# Patient Record
Sex: FEMALE | Race: WHITE | Employment: FULL TIME | URBAN - METROPOLITAN AREA
[De-identification: names, ages, dates, MRNs, and addresses within clinical notes are randomized per-mention and may not be internally consistent; named-entity substitution may affect disease eponyms.]

---

## 2021-05-29 ENCOUNTER — RECORDS - HEALTHEAST (OUTPATIENT)
Dept: ADMINISTRATIVE | Facility: CLINIC | Age: 51
End: 2021-05-29

## 2021-07-13 ENCOUNTER — RECORDS - HEALTHEAST (OUTPATIENT)
Dept: ADMINISTRATIVE | Facility: CLINIC | Age: 51
End: 2021-07-13

## 2021-07-21 ENCOUNTER — RECORDS - HEALTHEAST (OUTPATIENT)
Dept: ADMINISTRATIVE | Facility: CLINIC | Age: 51
End: 2021-07-21

## 2022-04-23 ENCOUNTER — TRANSFERRED RECORDS (OUTPATIENT)
Dept: HEALTH INFORMATION MANAGEMENT | Facility: CLINIC | Age: 52
End: 2022-04-23

## 2022-08-02 ENCOUNTER — TELEPHONE (OUTPATIENT)
Dept: FAMILY MEDICINE | Facility: CLINIC | Age: 52
End: 2022-08-02

## 2022-08-02 NOTE — TELEPHONE ENCOUNTER
Left message to call back for: Rosalia  Information to relay to patient: need to discuss [facility] program

## 2022-08-16 ENCOUNTER — OFFICE VISIT (OUTPATIENT)
Dept: FAMILY MEDICINE | Facility: CLINIC | Age: 52
End: 2022-08-16
Payer: COMMERCIAL

## 2022-08-16 VITALS
SYSTOLIC BLOOD PRESSURE: 163 MMHG | RESPIRATION RATE: 16 BRPM | TEMPERATURE: 98.4 F | WEIGHT: 223 LBS | OXYGEN SATURATION: 95 % | DIASTOLIC BLOOD PRESSURE: 94 MMHG | HEART RATE: 77 BPM

## 2022-08-16 DIAGNOSIS — L03.032 PARONYCHIA OF TOE, LEFT: Primary | ICD-10-CM

## 2022-08-16 PROCEDURE — 99203 OFFICE O/P NEW LOW 30 MIN: CPT | Performed by: PHYSICIAN ASSISTANT

## 2022-08-16 RX ORDER — CETIRIZINE HYDROCHLORIDE 10 MG/1
10 TABLET, CHEWABLE ORAL DAILY
COMMUNITY

## 2022-08-16 RX ORDER — BACITRACIN ZINC 500 [USP'U]/G
OINTMENT TOPICAL 2 TIMES DAILY
Qty: 5 G | Refills: 1 | Status: SHIPPED | OUTPATIENT
Start: 2022-08-16 | End: 2022-08-23

## 2022-08-16 RX ORDER — LOSARTAN POTASSIUM 100 MG/1
100 TABLET ORAL
COMMUNITY
Start: 2022-01-05 | End: 2022-10-06

## 2022-08-16 RX ORDER — HYDROCHLOROTHIAZIDE 25 MG/1
TABLET ORAL
COMMUNITY
Start: 2022-07-27 | End: 2022-10-06

## 2022-08-17 NOTE — PROGRESS NOTES
Assessment & Plan:      Problem List Items Addressed This Visit    None     Visit Diagnoses     Paronychia of toe, left    -  Primary    Relevant Medications    cetirizine (ZYRTEC) 10 MG CHEW    bacitracin 500 UNIT/GM external ointment        Medical Decision Making  Patient presents with acute onset mild paronychia of the left foot third toe.  Recommend topical antibiotics at this time.  Continue with warm soaks as needed.  May also use cold compresses and elevation of the foot to help with swelling.  She will continue to keep her appointment with the podiatrist.  Discussed signs of worsening symptoms and when to follow-up if needed.     Subjective:      Rosalia Bolden is a 52 year old female here for evaluation of toenail pain.  Onset of symptoms was 2 to 3 days ago.  Patient recently developed an ingrown toenail of the left foot third toe.  She recently clipped the toenail back, but has been noting some erythema, swelling, and tenderness.  No fevers.  She has an appointment with podiatry in 1 to 2 weeks for removal of the nail as she has had recurring paronychia of this toe.     The following portions of the patient's history were reviewed and updated as appropriate: allergies, current medications, and problem list.     Review of Systems  Pertinent items are noted in HPI.    Allergies  Allergies   Allergen Reactions     Amoxicillin [A-Cillin]      Iodine [Gnp Iodides Decolorized]      Mycelex [Clotrimazole]        No family history on file.    Social History     Tobacco Use     Smoking status: Never Smoker     Smokeless tobacco: Never Used   Substance Use Topics     Alcohol use: Not on file        Objective:      BP (!) 163/94   Pulse 77   Temp 98.4  F (36.9  C) (Oral)   Resp 16   Wt 101.2 kg (223 lb)   SpO2 95%   General appearance - alert, well appearing, and in no distress and non-toxic  Skin - Left foot third toe: Very mild erythema at the edges of the toenail bilaterally, no active purulent  drainage, no active ingrown toenail visualized; toes otherwise normal to touch     Lab & Imaging Results    No results found for any visits on 08/16/22.    I personally reviewed these results and discussed findings with the patient.    The use of Dragon/Tempolib dictation services was used to construct the content of this note; any grammatical errors are non-intentional. Please contact the author directly if you are in need of any clarification.

## 2022-08-22 ENCOUNTER — OFFICE VISIT (OUTPATIENT)
Dept: FAMILY MEDICINE | Facility: CLINIC | Age: 52
End: 2022-08-22
Payer: COMMERCIAL

## 2022-08-22 VITALS
SYSTOLIC BLOOD PRESSURE: 138 MMHG | TEMPERATURE: 98.4 F | WEIGHT: 223.1 LBS | DIASTOLIC BLOOD PRESSURE: 82 MMHG | HEART RATE: 72 BPM

## 2022-08-22 DIAGNOSIS — M79.662 PAIN OF LEFT LOWER LEG: Primary | ICD-10-CM

## 2022-08-22 DIAGNOSIS — E66.812 CLASS 2 OBESITY DUE TO EXCESS CALORIES WITHOUT SERIOUS COMORBIDITY WITH BODY MASS INDEX (BMI) OF 35.0 TO 35.9 IN ADULT: ICD-10-CM

## 2022-08-22 DIAGNOSIS — I10 PRIMARY HYPERTENSION: ICD-10-CM

## 2022-08-22 DIAGNOSIS — E66.09 CLASS 2 OBESITY DUE TO EXCESS CALORIES WITHOUT SERIOUS COMORBIDITY WITH BODY MASS INDEX (BMI) OF 35.0 TO 35.9 IN ADULT: ICD-10-CM

## 2022-08-22 DIAGNOSIS — Z23 ENCOUNTER FOR ADMINISTRATION OF VACCINE: ICD-10-CM

## 2022-08-22 DIAGNOSIS — M54.50 ACUTE LOW BACK PAIN, UNSPECIFIED BACK PAIN LATERALITY, UNSPECIFIED WHETHER SCIATICA PRESENT: ICD-10-CM

## 2022-08-22 PROCEDURE — 99214 OFFICE O/P EST MOD 30 MIN: CPT | Mod: 25 | Performed by: FAMILY MEDICINE

## 2022-08-22 PROCEDURE — 90714 TD VACC NO PRESV 7 YRS+ IM: CPT | Performed by: FAMILY MEDICINE

## 2022-08-22 PROCEDURE — 90471 IMMUNIZATION ADMIN: CPT | Performed by: FAMILY MEDICINE

## 2022-08-22 NOTE — PROGRESS NOTES
Assessment & Plan     Pain of left lower leg 2/2 fall 7/20/22  Improving  Reviewed the emergency department notes on July 28, 2022    Acute low back pain 2/2 fall   Improved  Reviewed the emergency department notes on July 28, 2022    Primary hypertension  Stable on losartan and hydrochlorothiazide  Follow-up with primary care provider at a regular basis    Encounter for administration of vaccine  - TD PRESERV FREE, IM (7+ YRS) (DECAVAC/TENIVAC)    Class 2 obesity due to excess calories without serious comorbidity with body mass index (BMI) of 35.0 to 35.9 in adult  Motivational interviewing was utilized today  Start with avoiding snacking and stay busy throughout the day.      Rachna De Oliveira MD  [facility]   Rosalia is a 52 year old presenting for the following health issues:  Hospital F/U (Fell down the stairs at home 7/20, still has pain and swelling in left leg and right ankle.  Lower back is still bothersome, rates pain 1/10 today.   Went to the the [facility] room 7/20, CT scans were taken.  Was prescribed Flexeril and Lidocaine patches, she did not fill and was taking OTC for pain relief.  )      HPI     I reviewed ED notes from 7/20/22 whereby she tripped and fell down a full flight of stairs.  Fall; closed head injury; contusion of back, abdominal wall, knee, leg  Xray knee, CT chest/abdomen, CT spine, CT head were unremarkable    Feeling better  Still a little sore on her left leg and back  Trying to walk more  Only taking advil      Objective    /82 (BP Location: Left arm, Patient Position: Sitting, Cuff Size: Adult Large)   Pulse 72   Temp 98.4  F (36.9  C) (Oral)   Wt 101.2 kg (223 lb 1.6 oz)   There is no height or weight on file to calculate BMI.  Physical Exam     Constitutional: Patient is oriented to person, place, and time. Patient appears well-developed and well-nourished. No distress.   Head: Normocephalic and atraumatic.    Right Ear: External ear normal.   Left Ear: External ear normal.   Eyes: Conjunctivae and EOM are normal. Right eye exhibits no discharge. Left eye exhibits no discharge. No scleral icterus.   Neurological: Patient is alert and oriented to person, place, and time.  Skin: No rash noted. Patient is not diaphoretic. No erythema. No pallor.  Left leg: No swelling, deformity, ecchymosis.  No tenderness to palpation.  She has full range of motion of her knee.

## 2022-08-29 ENCOUNTER — OFFICE VISIT (OUTPATIENT)
Dept: PODIATRY | Facility: CLINIC | Age: 52
End: 2022-08-29
Payer: COMMERCIAL

## 2022-08-29 VITALS
SYSTOLIC BLOOD PRESSURE: 138 MMHG | RESPIRATION RATE: 12 BRPM | HEART RATE: 98 BPM | TEMPERATURE: 98.1 F | DIASTOLIC BLOOD PRESSURE: 80 MMHG

## 2022-08-29 DIAGNOSIS — L60.0 INGROWING NAIL: Primary | ICD-10-CM

## 2022-08-29 PROCEDURE — 99203 OFFICE O/P NEW LOW 30 MIN: CPT | Mod: 25 | Performed by: PODIATRIST

## 2022-08-29 PROCEDURE — 11750 EXCISION NAIL&NAIL MATRIX: CPT | Mod: T2 | Performed by: PODIATRIST

## 2022-08-29 RX ORDER — LIDOCAINE 50 MG/G
PATCH TOPICAL
COMMUNITY
Start: 2022-07-21 | End: 2022-10-06

## 2022-08-29 RX ORDER — IBUPROFEN 600 MG/1
600 TABLET, FILM COATED ORAL
COMMUNITY
Start: 2021-07-07 | End: 2022-10-06

## 2022-08-29 RX ORDER — CHOLECALCIFEROL (VITAMIN D3) 50 MCG
1 TABLET ORAL DAILY
COMMUNITY

## 2022-08-29 RX ORDER — CLINDAMYCIN HCL 300 MG
CAPSULE ORAL
COMMUNITY
Start: 2022-08-24

## 2022-08-29 RX ORDER — MELOXICAM 15 MG/1
15 TABLET ORAL
COMMUNITY
Start: 2022-04-12 | End: 2022-10-06

## 2022-08-29 RX ORDER — FLUOCINOLONE ACETONIDE 0.11 MG/ML
OIL AURICULAR (OTIC)
COMMUNITY
Start: 2021-09-06

## 2022-08-29 RX ORDER — MAGNESIUM OXIDE 400 MG/1
400 TABLET ORAL DAILY
COMMUNITY

## 2022-08-29 RX ORDER — METFORMIN HCL 500 MG
TABLET, EXTENDED RELEASE 24 HR ORAL
COMMUNITY
Start: 2022-06-23 | End: 2022-10-06

## 2022-08-29 RX ORDER — CYCLOBENZAPRINE HCL 10 MG
TABLET ORAL
COMMUNITY
Start: 2022-07-21 | End: 2022-10-06

## 2022-08-29 RX ORDER — TRIAMCINOLONE ACETONIDE 55 UG/1
1-2 SPRAY, METERED NASAL
COMMUNITY

## 2022-08-29 RX ORDER — ESTRADIOL 0.1 MG/G
CREAM VAGINAL
COMMUNITY
Start: 2021-10-05 | End: 2022-10-06

## 2022-08-29 RX ORDER — NYSTATIN 100000 U/G
CREAM TOPICAL DAILY PRN
COMMUNITY
Start: 2022-02-16

## 2022-08-29 RX ORDER — TRIAMCINOLONE ACETONIDE 1 MG/G
CREAM TOPICAL PRN
COMMUNITY
Start: 2022-02-16

## 2022-08-29 RX ORDER — POLYETHYLENE GLYCOL 3350 17 G/17G
1 POWDER, FOR SOLUTION ORAL DAILY
COMMUNITY

## 2022-08-29 RX ORDER — GLYCERIN/MIN OIL/POLYCARBOPHIL
6.7 GEL WITH APPLICATOR (GRAM) VAGINAL
COMMUNITY
Start: 2021-10-05

## 2022-08-29 ASSESSMENT — PAIN SCALES - GENERAL: PAINLEVEL: NO PAIN (1)

## 2022-08-29 NOTE — LETTER
"    8/29/2022         RE: Rosalia Bolden  [address]  [place] [address]        Dear Colleague,    Thank you for referring your patient, Rosalia Bolden, to the [facility]. Please see a copy of my visit note below.    FOOT AND ANKLE SURGERY/PODIATRY Progress Note        ASSESSMENT:   Ingrown Nail       TREATMENT:  -There is an ingrown nail on the lateral border left 3rd digit. We discussed both temporary and permanent nail removal today. She understands that the nail may become symptomatic in 2% of cases. She consents to the permanent nail avulsion procedure today.  -5 cc of 1% lidocaine plain was injected into the 3rd digit left foot. The digit was cleansed with betadine swab. Following the application of a digital Tourni-cot the following procedures were performed.  Attention was directed to the lateral border of the 3rd digit left toenail where utilizing an [place] elevator and an English anvil nail splitter the nail was split from distal to proximal to the level of the epionychium.  Next the offending border was removed.  3,30 second applications of phenol were applied to the matrix.  The wound was then flushed with copious amounts of alcohol. The Tourni-cot was removed. A dressing was applied comprising of bacitracin 2x2 and 1\" coban wrap.  The tourniquet was removed and normal color returned.    -Post-op instructions were dispensed. All questions invited and answered.   -I would like to see the patient again in one week for follow-up.     Damion Tabor DPM  [facility]      HPI: I was asked to see Rosalia Bolden today for a painful ingrown nail on the 3rd digit left foot. She complains of on-going pain and has tried to remove the ingrown nail herself with some relief. She would like to discuss permanent nail procedure today.     No past medical history on file.    No past surgical history on file.    Allergies   Allergen Reactions     " Amoxicillin [A-Cillin]      Iodine [Gnp Iodides Decolorized]      Mycelex [Clotrimazole]          Current Outpatient Medications:      cetirizine (ZYRTEC) 10 MG CHEW, Take 10 mg by mouth daily, Disp: , Rfl:      estradiol (ESTRACE) 0.1 MG/GM vaginal cream, Apply pea sized amount to perineum once daily if needed for irritation, Disp: , Rfl:      FLUNISOLIDE, NASAL, NA, Spray 2 sprays in nostril daily, Disp: , Rfl:      fluocinolone acetonide 0.01 % OIL, APPLY 2 DROPS IN BOTH EARS EVERY OTHER NIGHT FOR 1 WEEK, THEN AS NEEDED FOR EAR ITCHING., Disp: , Rfl:      hydrochlorothiazide (HYDRODIURIL) 25 MG tablet, , Disp: , Rfl:      ibuprofen (ADVIL/MOTRIN) 600 MG tablet, Take 600 mg by mouth, Disp: , Rfl:      lidocaine (LIDODERM) 5 % patch, , Disp: , Rfl:      losartan (COZAAR) 100 MG tablet, Take 100 mg by mouth, Disp: , Rfl:      magnesium oxide (MAG-OX) 400 MG tablet, , Disp: , Rfl:      nystatin (MYCOSTATIN) 399286 UNIT/GM external cream, , Disp: , Rfl:      polyethylene glycol (MIRALAX) 17 GM/Dose powder, , Disp: , Rfl:      Skin Protectants, Misc. (LANTISEPTIC SKIN PROTECTANT) 50 % ointment, Apply 4 g topically, Disp: , Rfl:      triamcinolone (KENALOG) 0.1 % external cream, , Disp: , Rfl:      triamcinolone (NASACORT) 55 MCG/ACT nasal aerosol, Spray 1-2 sprays in nostril, Disp: , Rfl:      Vaginal Lubricant (REPLENS) GEL, Place 6.7 g vaginally, Disp: , Rfl:      vitamin D3 (CHOLECALCIFEROL) 50 mcg (2000 units) tablet, , Disp: , Rfl:      BORIC ACID, Boric Acid 500 MG/vaginal SUPPOSITORY one tab as needed in vagina (Patient not taking: Reported on 8/16/2022), Disp: , Rfl:      clindamycin (CLEOCIN) 300 MG capsule, TAKE 2 CAPSULES 1 HOUR BEFORE APPOINTMENT (Patient not taking: Reported on 8/29/2022), Disp: , Rfl:      cyclobenzaprine (FLEXERIL) 10 MG tablet, , Disp: , Rfl:      diphenhydrAMINE (BENADRYL) 25 MG tablet, Take 1 tablet by mouth every 4 hours as needed. (Patient not taking: Reported on 8/16/2022), Disp: ,  Rfl:      meloxicam (MOBIC) 15 MG tablet, Take 15 mg by mouth (Patient not taking: Reported on 8/29/2022), Disp: , Rfl:      metFORMIN (GLUCOPHAGE XR) 500 MG 24 hr tablet, , Disp: , Rfl:      omeprazole (PRILOSEC) 20 MG DR capsule, Take 20 mg by mouth (Patient not taking: Reported on 8/29/2022), Disp: , Rfl:     No family history on file.    Social History     Socioeconomic History     Marital status:      Spouse name: Not on file     Number of children: Not on file     Years of education: Not on file     Highest education level: Not on file   Occupational History     Not on file   Tobacco Use     Smoking status: Never Smoker     Smokeless tobacco: Never Used   Substance and Sexual Activity     Alcohol use: Not on file     Drug use: Not on file     Sexual activity: Not on file   Other Topics Concern     Not on file   Social History Narrative     Not on file     Social Determinants of Health     Financial Resource Strain: Not on file   Food Insecurity: Not on file   Transportation Needs: Not on file   Physical Activity: Not on file   Stress: Not on file   Social Connections: Not on file   Intimate Partner Violence: Not on file   Housing Stability: Not on file       10 point Review of Systems is negative except for ingrown nail.     /80   Pulse 98   Temp 98.1  F (36.7  C)   Resp 12     BMI= There is no height or weight on file to calculate BMI.      OBJECTIVE:  Appearance: alert, well appearing, and in no distress.    /80   Pulse 98   Temp 98.1  F (36.7  C)   Resp 12     Vascular: Dorsalis pedis and posterior tibial pulses are palpable. There is pedal hair growth.  CFT < 3 sec from anterior tibial surface to distal digits bilaterally. There is no appreciable edema noted.  Dermatologic: Incurvated nail border along the lateral border 3rd digit left foot, no erythema.   Neurologic: All epicritic and proprioceptive sensations are grossly intact bilaterally.   Musculoskeletal: Mild pain lateral  border 3rd digit left foot.        Again, thank you for allowing me to participate in the care of your patient.        Sincerely,        Damion Tabor DPM

## 2022-08-29 NOTE — PROGRESS NOTES
"FOOT AND ANKLE SURGERY/PODIATRY Progress Note        ASSESSMENT:   Ingrown Nail       TREATMENT:  -There is an ingrown nail on the lateral border left 3rd digit. We discussed both temporary and permanent nail removal today. She understands that the nail may become symptomatic in 2% of cases. She consents to the permanent nail avulsion procedure today.  -5 cc of 1% lidocaine plain was injected into the 3rd digit left foot. The digit was cleansed with betadine swab. Following the application of a digital Tourni-cot the following procedures were performed.  Attention was directed to the lateral border of the 3rd digit left toenail where utilizing an [place] elevator and an English anvil nail splitter the nail was split from distal to proximal to the level of the epionychium.  Next the offending border was removed.  3,30 second applications of phenol were applied to the matrix.  The wound was then flushed with copious amounts of alcohol. The Tourni-cot was removed. A dressing was applied comprising of bacitracin 2x2 and 1\" coban wrap.  The tourniquet was removed and normal color returned.    -Post-op instructions were dispensed. All questions invited and answered.   -I would like to see the patient again in one week for follow-up.     Damion Tabor DPM  [facility]      HPI: I was asked to see Rosalia Bolden today for a painful ingrown nail on the 3rd digit left foot. She complains of on-going pain and has tried to remove the ingrown nail herself with some relief. She would like to discuss permanent nail procedure today.     No past medical history on file.    No past surgical history on file.    Allergies   Allergen Reactions     Amoxicillin [A-Cillin]      Iodine [Gnp Iodides Decolorized]      Mycelex [Clotrimazole]          Current Outpatient Medications:      cetirizine (ZYRTEC) 10 MG CHEW, Take 10 mg by mouth daily, Disp: , Rfl:      estradiol (ESTRACE) 0.1 MG/GM vaginal cream, " Apply pea sized amount to perineum once daily if needed for irritation, Disp: , Rfl:      FLUNISOLIDE, NASAL, NA, Spray 2 sprays in nostril daily, Disp: , Rfl:      fluocinolone acetonide 0.01 % OIL, APPLY 2 DROPS IN BOTH EARS EVERY OTHER NIGHT FOR 1 WEEK, THEN AS NEEDED FOR EAR ITCHING., Disp: , Rfl:      hydrochlorothiazide (HYDRODIURIL) 25 MG tablet, , Disp: , Rfl:      ibuprofen (ADVIL/MOTRIN) 600 MG tablet, Take 600 mg by mouth, Disp: , Rfl:      lidocaine (LIDODERM) 5 % patch, , Disp: , Rfl:      losartan (COZAAR) 100 MG tablet, Take 100 mg by mouth, Disp: , Rfl:      magnesium oxide (MAG-OX) 400 MG tablet, , Disp: , Rfl:      nystatin (MYCOSTATIN) 307923 UNIT/GM external cream, , Disp: , Rfl:      polyethylene glycol (MIRALAX) 17 GM/Dose powder, , Disp: , Rfl:      Skin Protectants, Misc. (LANTISEPTIC SKIN PROTECTANT) 50 % ointment, Apply 4 g topically, Disp: , Rfl:      triamcinolone (KENALOG) 0.1 % external cream, , Disp: , Rfl:      triamcinolone (NASACORT) 55 MCG/ACT nasal aerosol, Spray 1-2 sprays in nostril, Disp: , Rfl:      Vaginal Lubricant (REPLENS) GEL, Place 6.7 g vaginally, Disp: , Rfl:      vitamin D3 (CHOLECALCIFEROL) 50 mcg (2000 units) tablet, , Disp: , Rfl:      BORIC ACID, Boric Acid 500 MG/vaginal SUPPOSITORY one tab as needed in vagina (Patient not taking: Reported on 8/16/2022), Disp: , Rfl:      clindamycin (CLEOCIN) 300 MG capsule, TAKE 2 CAPSULES 1 HOUR BEFORE APPOINTMENT (Patient not taking: Reported on 8/29/2022), Disp: , Rfl:      cyclobenzaprine (FLEXERIL) 10 MG tablet, , Disp: , Rfl:      diphenhydrAMINE (BENADRYL) 25 MG tablet, Take 1 tablet by mouth every 4 hours as needed. (Patient not taking: Reported on 8/16/2022), Disp: , Rfl:      meloxicam (MOBIC) 15 MG tablet, Take 15 mg by mouth (Patient not taking: Reported on 8/29/2022), Disp: , Rfl:      metFORMIN (GLUCOPHAGE XR) 500 MG 24 hr tablet, , Disp: , Rfl:      omeprazole (PRILOSEC) 20 MG DR capsule, Take 20 mg by mouth  (Patient not taking: Reported on 8/29/2022), Disp: , Rfl:     No family history on file.    Social History     Socioeconomic History     Marital status:      Spouse name: Not on file     Number of children: Not on file     Years of education: Not on file     Highest education level: Not on file   Occupational History     Not on file   Tobacco Use     Smoking status: Never Smoker     Smokeless tobacco: Never Used   Substance and Sexual Activity     Alcohol use: Not on file     Drug use: Not on file     Sexual activity: Not on file   Other Topics Concern     Not on file   Social History Narrative     Not on file     Social Determinants of Health     Financial Resource Strain: Not on file   Food Insecurity: Not on file   Transportation Needs: Not on file   Physical Activity: Not on file   Stress: Not on file   Social Connections: Not on file   Intimate Partner Violence: Not on file   Housing Stability: Not on file       10 point Review of Systems is negative except for ingrown nail.     /80   Pulse 98   Temp 98.1  F (36.7  C)   Resp 12     BMI= There is no height or weight on file to calculate BMI.      OBJECTIVE:  Appearance: alert, well appearing, and in no distress.    /80   Pulse 98   Temp 98.1  F (36.7  C)   Resp 12     Vascular: Dorsalis pedis and posterior tibial pulses are palpable. There is pedal hair growth.  CFT < 3 sec from anterior tibial surface to distal digits bilaterally. There is no appreciable edema noted.  Dermatologic: Incurvated nail border along the lateral border 3rd digit left foot, no erythema.   Neurologic: All epicritic and proprioceptive sensations are grossly intact bilaterally.   Musculoskeletal: Mild pain lateral border 3rd digit left foot.

## 2022-08-29 NOTE — PATIENT INSTRUCTIONS
POSTOPERATIVE INSTRUCTIONS AFTER CHEMICAL NAIL REMOVAL SURGERY    What to expect after surgery:  Your toe will be numb for around 2-8 hours after your nail procedure due to the shots that were given.  Expect some degree of soreness in your toe later today when the numbness wears off.  Rest, elevation and ice applied at the ankle will help ease the pain. Your bandage was wrapped snug to cut down on bleeding.    This may feel tight when the numbness wears off.  Please remove the bandage tomorrow morning and begin the foot soaks described below.  Warm water will feel good and helps to ease the pain  How to Care for Your Toe After Surgery  One daily foot soak will be necessary to heal properly.  Chemicals were used to kill the root of the nail.  Expect local redness, drainage and tenderness , this will last for 6-8 weeks.  Soaking helps loosen the scab and dried drainage.  Failure to soak leads to a hard scab that blocks drainage.  Back up drainage increases the pain and the scab may need to be removed with another office procedure.  You will heal much quicker and be more comfortable if you are consistent with local wound care and foot soaks.  Soak one time every day for 2 weeks.  Soaks should last 10 minutes.  Soak after taking a shower to get the germs out.  Soak in warm water with hydrogen peroxide 5 parts water to 1 part hydrogen peroxide.  A small amount of bleeding may be noted which is normal.   Clean the surgical site with a Q-tip during each soak.  Dip the Q-tip in the water and gently clean away any crusted drainage.  The area may be tender but you will not harm anything with the Q-tip.  Pat the area dry and allow a few minutes to air dry before applying any bandages.  Flexible fabric style band aids work best.  In general, the area will be wet from drainage.  A small dab of antibiotic ointment is okay but watch out for excessive moisture.  White and wrinkled skin is a sign of too much moisture and that the  skin needs to be dried out. It is ok to allow the toe some air by removing the band aid as needed.  Activity  Feel free to do normal activities as tolerated on the following day.  Wear open toe sandals if regular shoes are not comfortable.  Avoid shoes that are tight on the toe.  Medications   Finish any antibiotics if they have been prescribed.  Tylenol or ibuprofen may be used as needed for pain.  Icing and elevation also help with pain and swelling.  Risks  Watch for signs of infection.  It is normal to see redness, local tenderness, and drainage around the nail area for up to 8 weeks after permanent nail removal surgery.  Call the clinic at [phone] if you see red streaks spreading up the toe, foot, or leg.  Fever and chills are also concerning and you should notify the clinic if you are having these symptoms.  If these symptoms occur when the clinic is closed, please go to urgent care.  How Well Does Permanent Nail Surgery Work?  Permanent nail surgery means that we intend that the nail problem will not come back.  In a small percentage of patients, nail problems return in about 6 months to a year.  We would like to see you back in the office if you are experiencing problems in the future.  Please call [phone] with any additional questions.

## 2022-09-06 ENCOUNTER — VIRTUAL VISIT (OUTPATIENT)
Dept: PODIATRY | Facility: CLINIC | Age: 52
End: 2022-09-06
Payer: COMMERCIAL

## 2022-09-06 DIAGNOSIS — L60.0 INGROWING NAIL: Primary | ICD-10-CM

## 2022-09-06 PROCEDURE — 99024 POSTOP FOLLOW-UP VISIT: CPT | Performed by: PODIATRIST

## 2022-09-06 NOTE — LETTER
9/6/2022         RE: Rosalia Bolden  [address]  [facility] [address]        Dear Colleague,    Thank you for referring your patient, Rosalia Bolden, to the [facility]. Please see a copy of my visit note below.    Podiatry Progress Note        ASSESSMENT: S/P Nail avulsion     Telephone visit: 8:14/8:16      TREATMENT:  -Surgical site on the left 3rd digit is progressing well.   -The patient will continue to apply a band aid during the day if drainage is noted, otherwise will avoid a dressing.   -The patient is discharged at this time, but encouraged to return as symptoms dictate.     Damion Tabor DPM  [facility] Podiatry/Foot & Ankle Surgery      HPI: Rosalia Bolden was seen again today s/p nail avulsion on the left 3rd digit nail avulsion. The patient states she continues to have drainage and increased pain the first night.     History reviewed. No pertinent past medical history.    Allergies   Allergen Reactions     Amoxicillin [A-Cillin]      Iodine [Gnp Iodides Decolorized]      Mycelex [Clotrimazole]          Current Outpatient Medications:      BORIC ACID, Boric Acid 500 MG/vaginal SUPPOSITORY one tab as needed in vagina, Disp: , Rfl:      cetirizine (ZYRTEC) 10 MG CHEW, Take 10 mg by mouth daily, Disp: , Rfl:      clindamycin (CLEOCIN) 300 MG capsule, , Disp: , Rfl:      cyclobenzaprine (FLEXERIL) 10 MG tablet, , Disp: , Rfl:      diphenhydrAMINE (BENADRYL) 25 MG tablet, Take 1 tablet by mouth every 4 hours as needed, Disp: , Rfl:      estradiol (ESTRACE) 0.1 MG/GM vaginal cream, Apply pea sized amount to perineum once daily if needed for irritation, Disp: , Rfl:      FLUNISOLIDE, NASAL, NA, Spray 2 sprays in nostril daily, Disp: , Rfl:      fluocinolone acetonide 0.01 % OIL, APPLY 2 DROPS IN BOTH EARS EVERY OTHER NIGHT FOR 1 WEEK, THEN AS NEEDED FOR EAR ITCHING., Disp: , Rfl:      hydrochlorothiazide (HYDRODIURIL) 25 MG tablet, , Disp: , Rfl:       ibuprofen (ADVIL/MOTRIN) 600 MG tablet, Take 600 mg by mouth, Disp: , Rfl:      lidocaine (LIDODERM) 5 % patch, , Disp: , Rfl:      losartan (COZAAR) 100 MG tablet, Take 100 mg by mouth, Disp: , Rfl:      magnesium oxide (MAG-OX) 400 MG tablet, , Disp: , Rfl:      meloxicam (MOBIC) 15 MG tablet, Take 15 mg by mouth, Disp: , Rfl:      metFORMIN (GLUCOPHAGE XR) 500 MG 24 hr tablet, , Disp: , Rfl:      nystatin (MYCOSTATIN) 338562 UNIT/GM external cream, , Disp: , Rfl:      omeprazole (PRILOSEC) 20 MG DR capsule, Take 20 mg by mouth, Disp: , Rfl:      polyethylene glycol (MIRALAX) 17 GM/Dose powder, , Disp: , Rfl:      Skin Protectants, Misc. (LANTISEPTIC SKIN PROTECTANT) 50 % ointment, Apply 4 g topically, Disp: , Rfl:      triamcinolone (KENALOG) 0.1 % external cream, , Disp: , Rfl:      triamcinolone (NASACORT) 55 MCG/ACT nasal aerosol, Spray 1-2 sprays in nostril, Disp: , Rfl:      Vaginal Lubricant (REPLENS) GEL, Place 6.7 g vaginally, Disp: , Rfl:      vitamin D3 (CHOLECALCIFEROL) 50 mcg (2000 units) tablet, , Disp: , Rfl:     Review of Systems - Negative       OBJECTIVE:  Appearance: alert, well appearing, and in no distress.    There were no vitals taken for this visit.            Again, thank you for allowing me to participate in the care of your patient.        Sincerely,        Damion Tabor DPM

## 2022-09-06 NOTE — PROGRESS NOTES
Podiatry Progress Note        ASSESSMENT: S/P Nail avulsion     Telephone visit: 8:14/8:16      TREATMENT:  -Surgical site on the left 3rd digit is progressing well.   -The patient will continue to apply a band aid during the day if drainage is noted, otherwise will avoid a dressing.   -The patient is discharged at this time, but encouraged to return as symptoms dictate.     Damion Tabor DPM  [facility]      HPI: Rosalia Bolden was seen again today s/p nail avulsion on the left 3rd digit nail avulsion. The patient states she continues to have drainage and increased pain the first night.     History reviewed. No pertinent past medical history.    Allergies   Allergen Reactions     Amoxicillin [A-Cillin]      Iodine [Gnp Iodides Decolorized]      Mycelex [Clotrimazole]          Current Outpatient Medications:      BORIC ACID, Boric Acid 500 MG/vaginal SUPPOSITORY one tab as needed in vagina, Disp: , Rfl:      cetirizine (ZYRTEC) 10 MG CHEW, Take 10 mg by mouth daily, Disp: , Rfl:      clindamycin (CLEOCIN) 300 MG capsule, , Disp: , Rfl:      cyclobenzaprine (FLEXERIL) 10 MG tablet, , Disp: , Rfl:      diphenhydrAMINE (BENADRYL) 25 MG tablet, Take 1 tablet by mouth every 4 hours as needed, Disp: , Rfl:      estradiol (ESTRACE) 0.1 MG/GM vaginal cream, Apply pea sized amount to perineum once daily if needed for irritation, Disp: , Rfl:      FLUNISOLIDE, NASAL, NA, Spray 2 sprays in nostril daily, Disp: , Rfl:      fluocinolone acetonide 0.01 % OIL, APPLY 2 DROPS IN BOTH EARS EVERY OTHER NIGHT FOR 1 WEEK, THEN AS NEEDED FOR EAR ITCHING., Disp: , Rfl:      hydrochlorothiazide (HYDRODIURIL) 25 MG tablet, , Disp: , Rfl:      ibuprofen (ADVIL/MOTRIN) 600 MG tablet, Take 600 mg by mouth, Disp: , Rfl:      lidocaine (LIDODERM) 5 % patch, , Disp: , Rfl:      losartan (COZAAR) 100 MG tablet, Take 100 mg by mouth, Disp: , Rfl:      magnesium oxide (MAG-OX) 400 MG tablet, , Disp: , Rfl:       meloxicam (MOBIC) 15 MG tablet, Take 15 mg by mouth, Disp: , Rfl:      metFORMIN (GLUCOPHAGE XR) 500 MG 24 hr tablet, , Disp: , Rfl:      nystatin (MYCOSTATIN) 151500 UNIT/GM external cream, , Disp: , Rfl:      omeprazole (PRILOSEC) 20 MG DR capsule, Take 20 mg by mouth, Disp: , Rfl:      polyethylene glycol (MIRALAX) 17 GM/Dose powder, , Disp: , Rfl:      Skin Protectants, Misc. (LANTISEPTIC SKIN PROTECTANT) 50 % ointment, Apply 4 g topically, Disp: , Rfl:      triamcinolone (KENALOG) 0.1 % external cream, , Disp: , Rfl:      triamcinolone (NASACORT) 55 MCG/ACT nasal aerosol, Spray 1-2 sprays in nostril, Disp: , Rfl:      Vaginal Lubricant (REPLENS) GEL, Place 6.7 g vaginally, Disp: , Rfl:      vitamin D3 (CHOLECALCIFEROL) 50 mcg (2000 units) tablet, , Disp: , Rfl:     Review of Systems - Negative       OBJECTIVE:  Appearance: alert, well appearing, and in no distress.    There were no vitals taken for this visit.

## 2022-10-02 ENCOUNTER — HEALTH MAINTENANCE LETTER (OUTPATIENT)
Age: 52
End: 2022-10-02

## 2022-10-06 ENCOUNTER — OFFICE VISIT (OUTPATIENT)
Dept: FAMILY MEDICINE | Facility: CLINIC | Age: 52
End: 2022-10-06
Payer: COMMERCIAL

## 2022-10-06 VITALS
HEART RATE: 74 BPM | SYSTOLIC BLOOD PRESSURE: 126 MMHG | WEIGHT: 212 LBS | BODY MASS INDEX: 36.19 KG/M2 | DIASTOLIC BLOOD PRESSURE: 67 MMHG | HEIGHT: 64 IN

## 2022-10-06 DIAGNOSIS — D35.2 PITUITARY ADENOMA (H): ICD-10-CM

## 2022-10-06 DIAGNOSIS — I10 PRIMARY HYPERTENSION: Primary | ICD-10-CM

## 2022-10-06 DIAGNOSIS — L50.1 IDIOPATHIC URTICARIA: ICD-10-CM

## 2022-10-06 DIAGNOSIS — K59.01 SLOW TRANSIT CONSTIPATION: ICD-10-CM

## 2022-10-06 DIAGNOSIS — N95.1 MENOPAUSAL VAGINAL DRYNESS: ICD-10-CM

## 2022-10-06 DIAGNOSIS — R73.03 PREDIABETES: ICD-10-CM

## 2022-10-06 DIAGNOSIS — E66.01 MORBID OBESITY (H): ICD-10-CM

## 2022-10-06 DIAGNOSIS — E78.2 MIXED HYPERLIPIDEMIA: ICD-10-CM

## 2022-10-06 LAB
ALBUMIN SERPL BCG-MCNC: 4.5 G/DL (ref 3.5–5.2)
ALP SERPL-CCNC: 77 U/L (ref 35–104)
ALT SERPL W P-5'-P-CCNC: 47 U/L (ref 10–35)
ANION GAP SERPL CALCULATED.3IONS-SCNC: 11 MMOL/L (ref 7–15)
AST SERPL W P-5'-P-CCNC: 32 U/L (ref 10–35)
BILIRUB SERPL-MCNC: 0.2 MG/DL
BUN SERPL-MCNC: 18.7 MG/DL (ref 6–20)
CALCIUM SERPL-MCNC: 10 MG/DL (ref 8.6–10)
CHLORIDE SERPL-SCNC: 99 MMOL/L (ref 98–107)
CHOLEST SERPL-MCNC: 223 MG/DL
CREAT SERPL-MCNC: 0.83 MG/DL (ref 0.51–0.95)
DEPRECATED HCO3 PLAS-SCNC: 27 MMOL/L (ref 22–29)
GFR SERPL CREATININE-BSD FRML MDRD: 83 ML/MIN/1.73M2
GLUCOSE SERPL-MCNC: 107 MG/DL (ref 70–99)
HBA1C MFR BLD: 5.8 % (ref 0–5.6)
HDLC SERPL-MCNC: 48 MG/DL
INSULIN SERPL-ACNC: 61.9 UU/ML (ref 2.6–24.9)
LDLC SERPL CALC-MCNC: 144 MG/DL
NONHDLC SERPL-MCNC: 175 MG/DL
POTASSIUM SERPL-SCNC: 3.8 MMOL/L (ref 3.4–5.3)
PROLACTIN SERPL 3RD IS-MCNC: 13 NG/ML (ref 5–23)
PROT SERPL-MCNC: 7.7 G/DL (ref 6.4–8.3)
SODIUM SERPL-SCNC: 137 MMOL/L (ref 136–145)
TRIGL SERPL-MCNC: 155 MG/DL

## 2022-10-06 PROCEDURE — 84146 ASSAY OF PROLACTIN: CPT | Performed by: FAMILY MEDICINE

## 2022-10-06 PROCEDURE — 80061 LIPID PANEL: CPT | Performed by: FAMILY MEDICINE

## 2022-10-06 PROCEDURE — 36415 COLL VENOUS BLD VENIPUNCTURE: CPT | Performed by: FAMILY MEDICINE

## 2022-10-06 PROCEDURE — 80053 COMPREHEN METABOLIC PANEL: CPT | Performed by: FAMILY MEDICINE

## 2022-10-06 PROCEDURE — 99214 OFFICE O/P EST MOD 30 MIN: CPT | Performed by: FAMILY MEDICINE

## 2022-10-06 PROCEDURE — 83036 HEMOGLOBIN GLYCOSYLATED A1C: CPT | Performed by: FAMILY MEDICINE

## 2022-10-06 PROCEDURE — 83525 ASSAY OF INSULIN: CPT | Performed by: FAMILY MEDICINE

## 2022-10-06 RX ORDER — HYDROCHLOROTHIAZIDE 25 MG/1
12.5 TABLET ORAL
COMMUNITY
Start: 2022-09-21 | End: 2022-10-06

## 2022-10-06 RX ORDER — IBUPROFEN 600 MG/1
600 TABLET, FILM COATED ORAL EVERY 6 HOURS PRN
COMMUNITY
Start: 2022-10-06

## 2022-10-06 RX ORDER — HYDROCHLOROTHIAZIDE 12.5 MG/1
12.5 TABLET ORAL DAILY
Qty: 90 TABLET | Refills: 3 | Status: SHIPPED | OUTPATIENT
Start: 2022-10-06

## 2022-10-06 RX ORDER — LOSARTAN POTASSIUM 100 MG/1
100 TABLET ORAL DAILY
Qty: 90 TABLET | Refills: 3 | Status: SHIPPED | OUTPATIENT
Start: 2022-10-06

## 2022-10-06 RX ORDER — ESTRADIOL 0.1 MG/G
CREAM VAGINAL
Qty: 42.5 G | Refills: 0 | Status: SHIPPED | OUTPATIENT
Start: 2022-10-06

## 2022-10-06 NOTE — Clinical Note
Please abstract the following data from this visit with this patient into the appropriate field in Epic:  Tests that can be patient reported without a hard copy:  Colonoscopy done on this date: 4/16/2018 (approximately), by this group: [facility], results were .  and Mammogram done on this date: 4/23/2022 (approximately), by this group: [facility], results were .   Other Tests found in the patient's chart through Chart Review/Care Everywhere:    Note to Abstraction: If this section is blank, no results were found via Chart Review/Care Everywhere.

## 2022-10-06 NOTE — PROGRESS NOTES
Assessment & Plan     Primary hypertension  Blood pressure well controlled on current regimen, losartan 100 mg and hydrochlorothiazide 12.5 mg which were refilled today.  Update monitoring labs.  - losartan (COZAAR) 100 MG tablet  Dispense: 90 tablet; Refill: 3  - hydrochlorothiazide (HYDRODIURIL) 12.5 MG tablet  Dispense: 90 tablet; Refill: 3  - Comprehensive metabolic panel (BMP + Alb, Alk Phos, ALT, AST, Total. Bili, TP)    Morbid obesity (H)  Mixed hyperlipidemia  Body mass index is 36.39 kg/m .  Reviewed diet/exercise recommendations.  Congratulated patient on weight loss through weight watchers.  Reassess fasting ASCVD risk today and determine if statin is indicated.  - Lipid panel reflex to direct LDL Non-fasting    Menopausal vaginal dryness  Rare topical estradiol use, refill provided.  - estradiol (ESTRACE) 0.1 MG/GM vaginal cream  Dispense: 42.5 g; Refill: 0    Prediabetes  Remains in prediabetic range with hemoglobin A1c of 5.8%.  She has known insulin resistance but has been hesitant to initiate metformin in the past.  - Hemoglobin A1c  - Insulin level    Pituitary adenoma (H)  Found to have benign pituitary tumor on the age of 16.  Last MRI was many years ago, though she would like to defer on the screening until next year.  We will repeat prolactin level to ensure stable.  She also plans to update her peripheral vision screen though denies any current vision concerns.  - Prolactin    Idiopathic urticaria  Stable with daily Zyrtec.    Slow transit constipation  Recommended adding daily fiber supplementation to help with stool consistency.  She will continue MiraLAX 1 capful daily.  Recommended increasing water and fiber in diet.      Isabella Sheffield DO  [facility] is a 52 year old, presenting for the following health issues:  Establish Care (fasting) and Recheck Medication (Refills needed)      History of Present Illness       Reason for visit:   Establish care and meds refills maybe labwork    She eats 2-3 servings of fruits and vegetables daily.She consumes 0 sweetened beverage(s) daily.She exercises with enough effort to increase her heart rate 10 to 19 minutes per day.  She exercises with enough effort to increase her heart rate 3 or less days per week.   She is taking medications regularly.     Vaginal dryness: Uses estrogen cream to the exterior vulva infrequently.  Sensitivity to progesterone.  Does not use internal application of this medication due to concern of risks from the medication.    Recurrent hives: Seen allergist in the past.  Symptoms controlled on daily Zyrtec.    Currently working for [employer].  Actually had her knees replaced through the [employer] system due to insurance coverage in 2021.  Has clindamycin on her medication list to be taken prior to dental visits x2 years.  Has been taking ibuprofen daily for her arthritis symptoms now only takes it as needed.    Continues to struggle with constipation.  Taking magnesium supplement which helps with bowel regularity and sleep.  Continues to take MiraLAX daily but admits to heaviness in her lower abdomen if she does not stool every day.  Takes laxatives as needed.  Last tried fiber in her 20s but this causes loose stools.  Willing to try this again.    Known prediabetes and insulin resistance.  Was prescribed metformin in the past but never took it due to concern of diarrhea.  She does have a glucose monitor at home which helps guide her dietary decisions.  Her insurance will unfortunately not cover weight loss visits so she has joined weight watchers and lost 11 pounds.  She recently had an ingrown toenail removed on her left second toe still been unable to wear tight fitting lace up shoes or exercise much.  She is waiting for this to heal and then would like to increase her activity.  She has been making diet modifications.    Found to have a pituitary adenoma at the age of 16.   "Had not had a period and found to have elevated prolactin level.  Does not feel she has had an MRI since the birth of her last child.  Also plans to schedule peripheral eye exam.  Denies current vision changes.        Objective    /67   Pulse 74   Ht 1.626 m (5' 4\")   Wt 96.2 kg (212 lb)   BMI 36.39 kg/m    Body mass index is 36.39 kg/m .  Physical Exam   GENERAL: healthy, alert and no distress  EYES: Eyes grossly normal to inspection, PERRL and conjunctivae and sclerae normal  RESP: lungs clear to auscultation - no rales, rhonchi or wheezes  CV: regular rate and rhythm, normal S1 S2, no S3 or S4, no murmur, click or rub, no peripheral edema and peripheral pulses strong  ABDOMEN: soft, nontender, no hepatosplenomegaly, no masses and bowel sounds normal  PSYCH: mentation appears normal, affect normal/bright    Results for orders placed or performed in visit on 10/06/22 (from the past 24 hour(s))   Hemoglobin A1c   Result Value Ref Range    Hemoglobin A1C 5.8 (H) 0.0 - 5.6 %             "

## 2022-10-10 ENCOUNTER — MYC MEDICAL ADVICE (OUTPATIENT)
Dept: FAMILY MEDICINE | Facility: CLINIC | Age: 52
End: 2022-10-10

## 2022-10-10 DIAGNOSIS — E88.819 INSULIN RESISTANCE: ICD-10-CM

## 2022-10-10 DIAGNOSIS — R73.03 PREDIABETES: Primary | ICD-10-CM

## 2022-10-10 RX ORDER — METFORMIN HCL 500 MG
500 TABLET, EXTENDED RELEASE 24 HR ORAL
Qty: 90 TABLET | Refills: 1 | Status: SHIPPED | OUTPATIENT
Start: 2022-10-10

## 2022-10-23 ENCOUNTER — OFFICE VISIT (OUTPATIENT)
Dept: FAMILY MEDICINE | Facility: CLINIC | Age: 52
End: 2022-10-23
Payer: COMMERCIAL

## 2022-10-23 VITALS
DIASTOLIC BLOOD PRESSURE: 71 MMHG | HEIGHT: 64 IN | BODY MASS INDEX: 35.05 KG/M2 | OXYGEN SATURATION: 97 % | WEIGHT: 205.3 LBS | TEMPERATURE: 98.7 F | SYSTOLIC BLOOD PRESSURE: 127 MMHG

## 2022-10-23 DIAGNOSIS — Z96.653 HISTORY OF TOTAL BILATERAL KNEE REPLACEMENT: ICD-10-CM

## 2022-10-23 DIAGNOSIS — L03.032 PARONYCHIA OF TOE, LEFT: Primary | ICD-10-CM

## 2022-10-23 DIAGNOSIS — L60.0 INGROWN NAIL: ICD-10-CM

## 2022-10-23 PROCEDURE — 99213 OFFICE O/P EST LOW 20 MIN: CPT | Performed by: FAMILY MEDICINE

## 2022-10-23 RX ORDER — CEPHALEXIN 500 MG/1
500 CAPSULE ORAL 3 TIMES DAILY
Qty: 10 CAPSULE | Refills: 0 | Status: SHIPPED | OUTPATIENT
Start: 2022-10-23

## 2022-10-23 NOTE — PROGRESS NOTES
Rosalia Bolden is a 52 year old female who comes in today for foot problem since last night    Hurt to touch sheets    A little red     Some pus out of it    Has had ingrown nail in past    Had both knees replaced last year      Full physical not done     Mentation and affect are fine    No tremor of speech or extremity    Patient has mild redness/ tenderness on lateral aspect of 2nd left toe next to nail  No current drainage but per patient she had some pus earlier    ASSESSMENT / PLAN:  (L03.032) Paronychia of toe, left  (primary encounter diagnosis)  Comment: will treat.  Patient has tolerated this before.  Do soaks.  See podiatry in a few days ( patient already has appointment )  Plan: cephALEXin (KEFLEX) 500 MG capsule             (L60.0) Ingrown nail  Comment: as above  Plan: cephALEXin (KEFLEX) 500 MG capsule             History of tka: another reason to cover with antibiotics       Of note patient has a bit of residual redness on lateral aspect of left 3rd toe next to nail but no tenderness there.  This nail was worked on a little while back.      I reviewed the patient's medications, allergies, medical history, family history, and social history.    Denis Watts MD

## 2024-07-27 ENCOUNTER — HEALTH MAINTENANCE LETTER (OUTPATIENT)
Age: 54
End: 2024-07-27